# Patient Record
Sex: FEMALE | Race: WHITE
[De-identification: names, ages, dates, MRNs, and addresses within clinical notes are randomized per-mention and may not be internally consistent; named-entity substitution may affect disease eponyms.]

---

## 2018-07-23 ENCOUNTER — HOSPITAL ENCOUNTER (OUTPATIENT)
Dept: HOSPITAL 62 - RAD | Age: 43
End: 2018-07-23
Attending: INTERNAL MEDICINE
Payer: OTHER GOVERNMENT

## 2018-07-23 DIAGNOSIS — K44.9: ICD-10-CM

## 2018-07-23 DIAGNOSIS — K21.9: Primary | ICD-10-CM

## 2018-07-23 PROCEDURE — 74220 X-RAY XM ESOPHAGUS 1CNTRST: CPT

## 2018-07-23 NOTE — RADIOLOGY REPORT (SQ)
EXAM DESCRIPTION:  BARIUM SWALLOW ESOPHAGUS



COMPLETED DATE/TIME:  7/23/2018 8:27 am



REASON FOR STUDY:  GASTRO ESOPHAGEAL REFLUX DISEASE WITHOUT K21.9  GASTRO-ESOPHAGEAL REFLUX DISEASE W
ITHOUT ESOPHAGITIS



COMPARISON:  None.



TECHNIQUE:  Under fluoroscopic guidance, patient ingested effervescent granules followed by thick and
 thin barium. Fluoroscopic spot images and routine radiographic images acquired and stored on PACS.

12 MM BARIUM TABLET GIVEN: The patient swallowed a 12 mm barium tablet which passed easily through th
e esophagus and into the stomach without delay.



LIMITATIONS:  None.



FLUOROSCOPY TIME:  FLUORO TIME: 3.3 minutes

6 images saved to PACS.



FINDINGS:  NEUROMUSCULAR COORDINATION OF SWALLOW: Normal. No aspiration.

ESOPHAGEAL MOTILITY: Normal peristalsis. No esophageal spasm.

ESOPHAGEAL MUCOSA: Normal mucosa without masses or ulceration.

GASTRO-ESOPHAGEAL JUNCTION: Small hiatal hernia present.  No reflux seen.

NON-GI TRACT STRUCTURES: No significant finding.

OTHER: No other significant finding.



IMPRESSION:  SMALL HIATAL HERNIA.  OTHERWISE UNREMARKABLE STUDY.



RECOMMENDATION:  NONE



COMMENT:  Quality :  Final reports for procedures using fluoroscopy that document radiation exp
osure indices, or exposure time and number of fluorographic images (if radiation exposure indices are
 not available)



TECHNICAL DOCUMENTATION:  JOB ID:  [id number]

 2011 [employer]- All Rights Reserved



Reading location - IP/workstation name: [email or web address] [id number]